# Patient Record
Sex: FEMALE | Race: WHITE
[De-identification: names, ages, dates, MRNs, and addresses within clinical notes are randomized per-mention and may not be internally consistent; named-entity substitution may affect disease eponyms.]

---

## 2018-02-25 ENCOUNTER — HOSPITAL ENCOUNTER (EMERGENCY)
Dept: HOSPITAL 24 - ER | Age: 33
Discharge: HOME | End: 2018-02-25
Payer: COMMERCIAL

## 2018-02-25 VITALS — BODY MASS INDEX: 28.5 KG/M2

## 2018-02-25 VITALS — DIASTOLIC BLOOD PRESSURE: 73 MMHG | SYSTOLIC BLOOD PRESSURE: 118 MMHG

## 2018-02-25 DIAGNOSIS — O20.9: Primary | ICD-10-CM

## 2018-02-25 LAB
BACTERIA URNS QL MICRO: (no result) /HPF
BASOPHILS # BLD AUTO: 0.3 X10^3/UL (ref 0–0.1)
BASOPHILS NFR BLD AUTO: 4.4 % (ref 0.2–1)
BILIRUB UR QL STRIP.AUTO: NEGATIVE
BUN SERPL-MCNC: 10 MG/DL (ref 7–18)
CALCIUM ALBUM COR SERPL-SCNC: (no result) MMOL/L (ref 136–145)
CALCIUM SERPL-MCNC: 9 MG/DL (ref 8.5–10.1)
CHLORIDE SERPL-SCNC: 104 MMOL/L (ref 98–107)
CLARITY UR: (no result)
CO2 SERPL-SCNC: 26.4 MMOL/L (ref 21–32)
COLOR UR AUTO: YELLOW
CREAT SERPL-MCNC: 0.81 MG/DL (ref 0.55–1.02)
EGFR  BLACK RACES: > 60 (ref 60–?)
EOSINOPHIL # BLD AUTO: 0.2 X10^3/UL (ref 0–0.2)
EOSINOPHIL NFR BLD AUTO: 3 % (ref 0.9–2.9)
ERYTHROCYTE [DISTWIDTH] IN BLOOD BY AUTOMATED COUNT: 11.7 % (ref 11.6–16.5)
GLUCOSE UR QL STRIP.AUTO: NEGATIVE
HCT VFR BLD AUTO: 39.1 % (ref 36–47)
HGB BLD-MCNC: 13.9 G/DL (ref 12–16)
KETONES UR QL STRIP.AUTO: NEGATIVE
LEUKOCYTE ESTERASE UR QL STRIP.AUTO: (no result)
LYMPHOCYTES # BLD AUTO: 1 X10^3/UL (ref 1.3–2.9)
LYMPHOCYTES NFR BLD AUTO: 17.8 % (ref 21–51)
MCH RBC QN AUTO: 33.1 PG (ref 27–34)
MCHC RBC AUTO-ENTMCNC: 35.6 G/DL (ref 33–35)
MCV RBC AUTO: 92.9 FL (ref 80–100)
MONOCYTES # BLD AUTO: 0.4 X10^3/UL (ref 0.3–0.8)
MONOCYTES NFR BLD AUTO: 7.7 % (ref 0–13)
NEUTROPHILS # BLD AUTO: 3.9 X10^3/UL (ref 2.2–4.8)
NEUTROPHILS NFR BLD AUTO: 67.1 % (ref 42–75)
NITRITE UR QL STRIP.AUTO: NEGATIVE
PH UR STRIP.AUTO: 7 [PH] (ref 5–8)
PLATELET # BLD: 221 X10^3/UL (ref 150–450)
PMV BLD AUTO: 9.8 FL (ref 7.4–11)
PROT UR QL STRIP.AUTO: (no result)
RBC # BLD AUTO: 4.21 X10^6/UL (ref 3.5–5.4)
RBC # UR STRIP.AUTO: (no result) /UL
RBC #/AREA URNS HPF: (no result) /HPF
SODIUM SERPL-SCNC: 139 MMOL/L (ref 136–145)
SP GR UR STRIP.AUTO: 1.01 (ref 1–1.03)
SQUAMOUS URNS QL MICRO: (no result) /HPF
UROBILINOGEN UR QL STRIP.AUTO: NORMAL
WBC NRBC COR # BLD AUTO: 5.8 X10^3/UL (ref 3.6–10)

## 2018-02-25 PROCEDURE — 76801 OB US < 14 WKS SINGLE FETUS: CPT

## 2018-02-25 PROCEDURE — 36415 COLL VENOUS BLD VENIPUNCTURE: CPT

## 2018-02-25 PROCEDURE — 85025 COMPLETE CBC W/AUTO DIFF WBC: CPT

## 2018-02-25 PROCEDURE — 80048 BASIC METABOLIC PNL TOTAL CA: CPT

## 2018-02-25 PROCEDURE — 81001 URINALYSIS AUTO W/SCOPE: CPT

## 2018-02-25 PROCEDURE — 99283 EMERGENCY DEPT VISIT LOW MDM: CPT

## 2018-02-25 PROCEDURE — 84702 CHORIONIC GONADOTROPIN TEST: CPT

## 2018-02-25 PROCEDURE — 99284 EMERGENCY DEPT VISIT MOD MDM: CPT

## 2018-02-25 NOTE — US
History: Pregnant with vaginal bleeding and left-sided pelvic pain. Quantitative HCG of 32. Prior HCG
 of 110.



Technique: Using a transabdominal approach multiple sagittal images of the uterus and adnexa were obt
ained.



Comparison:NONE



Findings:

Uterus is anteverted measuring 6.0 x 4.2 x 7.1 cm. It appears homogeneous. The endometrium measures 7
 mm in thickness. No endometrial fluid is demonstrated. No intrauterine gestation was seen.



The right ovary measures 2.7 x 2.0 x 2.1 cm. The left ovary measures 3.0 x 1.6 x 2.9 cm. Both ovaries
 appear normal. No significant free fluid demonstrated. No gestational sac demonstrated.



Impression: 

1. Pregnancy of unknown location. No gestational sac demonstrated. Adnexae appear normal. Differentia
l considerations include a normal early pregnancy, ectopic pregnancy, spontaneous . Given dec
reasing HCG, findings are concerning for abnormal pregnancy. Obstetrical follow-up and serial ultraso
und and serial HCG recommended.







Reported By:Electronically Signed by MYA LANE MD at 2018 11:40:58 AM

## 2025-03-20 NOTE — DR.GENAD
HPI





- PCP


Primary Care Physician: junito





- Complaint/Symptoms


Chief Complaint Doctors Comments: This 31 y/o   female presented to 

the ED with complain of left side cramping and vaginal bleeding onset two days 

ago. The blood was clotted,increased since previous two days. She denies 

vomiting or fever. She admits to smoking  3-5 cigaretts daily. She saw her OB (

Dr Valadez [phone]) on 18 OB  pregnancy visit.. [id number].


Chief Complaint:: PT C/O VAGINAL BLEEDING WITH CLOTTING. PT STATES SHE IS 

PREGNANT, BUT UNSURE OF HOW FAR ALONG SHE IS PT STATES SHE HAS BEEN TO THE ER 

IN [place] AND HAD HCG LEVELS CHECKED, AND WAS TOLD IF THE BLEEDING GOT WORSE 

TO GO AND HAVE THEM RECHECKED TO SEE IF THEY WERE GOING DOWN OR UP





- Source


History Provided: Patient





- Mode of Arrival


Mode of Arrival: Ambulatory





- Timing


Onset of Chief Complaint: 18





PMH





- PMH


Past Medical History: No


Past Surgical History: Yes


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





- Family History


History of Family Medical Conditions: Yes


Family Medical History: MI, Hypertension





- Social History


Does patient currently use any type of tobacco product: Yes


Have you used tobacco products in the last 12 months: Yes


Type of Tobacco Use: Cigarettes


Alcohol Use: None


Do you use any recreational Drugs:: No


Lives With: Family


Lives Where: Home





- infectious screening


In the last 2 months have you had wt loss of >10#?: NO


Have you had fever, night sweats or hemotysis?: No


Have you traveled outside the country in the last 6 months?: No


Isolation: Standard





ROS





- Review of Systems


Constitutional: See HPI


Eyes: No Symptoms Reported


ENTM: No Symptoms Reported


Respiratoy: No Symptoms Reported


Cardiovascular: No Symptoms Reported


Gastrointestinal/Abdominal: No Symptoms Reported


Genitourinary: No Symptoms Reported


Neurological: No Symptoms Reported


Musculoskeletal: No Symptoms Reported


Integumentary: No Symptoms Reported


Hematologic/Lymphatic: No Symptoms Reported


Endocrine: No Symptoms Reported


All Other Systems: Reviewed and Negative





PE





- Vital Signs


Vitals: 


 





Temperature                      98.1 F


Pulse Rate                       93


Respiratory Rate                 20


Blood Pressure                   118/73


O2 Sat by Pulse Oximetry         97











- General


Limitations: No Limitations


General Appearance: Alert, In No Apparent Distress





- Head


Head Exam: Normal Inspection, Atraumatic





- Eyes


Eye exam: Normal Appearance, PERRL, EOMI





- ENT


ENT Exam: Normal Exam, Normal Oropharynx


External Ear Exam: Normal External Inspection


TM/Canal Exam: Bilateral Normal


Nose Exam: Normal Nose Exam


Mouth Exam: Normal Inspection


Throat Exam: Normal Inspection





- Neck


Neck Exam: Normal Inspection, Full ROM





- Chest


Chest Inspection: Normal Inspection





- Respiratory


Respiratory Exam: Normal Lung Sounds Bilat


Respiratory Exam: Bilateral Clear to Auscultation





- Cardiovascular


Cardiovascular Exam: Regular Rate, Normal Rhythm





- Abdominal Exam


Abdominal Exam: Normal Inspection, Normal Bowel Sounds


Abdominal Tenderness: LLQ





- Extremities


Extremities Exam: Normal Inspection





- Back


Back Exam: Normal Inspection





- Neurologic


Neurological Exam: Alert, Oriented X3, CN II-XII Intact





- Psychiatric


Psychiatric Exam: Normal Affect, Normal Mood





Course





- Reevaluation


1st: Unchanged





ROR





- Labs Reviewed


Result Diagrams: 


 18 10:05





 18 10:05


Laboratory: 


 











WBC  5.8 X10^3/uL (3.6-10.0)   18  10:05    


 


RBC  4.21 X10^6/uL (3.5-5.4)   18  10:05    


 


Hgb  13.9 g/dL (12.0-16.0)   18  10:05    


 


Hct  39.1 % (36.0-47.0)   18  10:05    


 


MCV  92.9 fL (80.0-100.0)   18  10:05    


 


MCH  33.1 pg (27.0-34.0)   18  10:05    


 


MCHC  35.6 g/dL (33.0-35.0)  H  18  10:05    


 


RDW  11.7 % (11.6-16.5)   18  10:05    


 


Plt Count  221 X10^3/uL (150.0-450.0)   18  10:05    


 


MPV  9.8 fL (7.4-11.0)   18  10:05    


 


Neut %  67.1 % (42.0-75.0)   18  10:05    


 


Lymph %  17.8 % (21.0-51.0)  L  18  10:05    


 


Mono %  7.7 % (0.0-13.0)   18  10:05    


 


Eos %  3.0 % (0.9-2.9)  H  18  10:05    


 


Baso %  4.4 % (0.2-1.0)  H  18  10:05    


 


Neut #  3.9 x10^3/uL (2.2-4.8)   18  10:05    


 


Lymph #  1.0 X10^3/uL (1.3-2.9)  L  18  10:05    


 


Mono #  0.4 x10^3/uL (0.3-0.8)   18  10:05    


 


Eos #  0.2 x10^3/uL (0.0-0.2)   18  10:05    


 


Baso #  0.3 X10^3/uL (0.0-0.1)  H  18  10:05    


 


Absolute Nucleated RBC  0.0 /100WBC  18  10:05    


 


Sodium  139 mmol/L (136-145)   18  10:05    


 


Corrected Sodium  TNP   18  10:05    


 


Potassium  4.2 mmol/L (3.5-5.1)   18  10:05    


 


Chloride  104 mmol/L ()   18  10:05    


 


Carbon Dioxide  26.4 mmol/L (21-32)   18  10:05    


 


BUN  10 mg/dL (7-18)   18  10:05    


 


Creatinine  0.81 mg/dL (0.55-1.02)   18  10:05    


 


Est GFR (MDRD) Af Amer  > 60  (>60)   18  10:05    


 


Est GFR (MDRD) Non-Af  > 60  (>60)   18  10:05    


 


Glucose  102 mg/dL (65-99)  H  18  10:05    


 


Calcium  9.0 mg/dL (8.5-10.1)   18  10:05    


 


HCG, Quant  32 mIU/mL (0-6)  H  18  10:05    


 


Specimen Type  Clean catch urine   18  10:01    


 


Urine Color  Yellow  (YELLOW)   18  10:01    


 


Urine Appearance  Hazy  (CLEAR)   18  10:01    


 


Urine pH  7.0  (5.0 - 8.0)   18  10:01    


 


Ur Specific Gravity  1.010  (1.000-1.030)   18  10:01    


 


Urine Protein  1+  (NEGATIVE)   18  10:01    


 


Urine Glucose (UA)  Negative  (NEGATIVE)   18  10:01    


 


Urine Ketones  Negative  (NEGATIVE)   18  10:01    


 


Urine Occult Blood  5+  (NEGATIVE)   18  10:01    


 


Urine Nitrite  Negative  (NEGATIVE)   18  10:01    


 


Urine Bilirubin  Negative  (NEGATIVE)   18  10:01    


 


Urine Urobilinogen  Normal  (NORMAL)   18  10:01    


 


Ur Leukocyte Esterase  1+  (NEGATIVE)   18  10:01    


 


Urine RBC  20-25 /HPF (NONE SEEN)   18  10:01    


 


Urine WBC  0-2 /HPF (NONE SEEN)   18  10:01    


 


Ur Squamous Epith Cells  Few /HPF (NEGATIVE)   18  10:01    


 


Urine Bacteria  Trace /HPF (NEGATIVE)   18  10:01    


 


Ur Culture Indicated?  No/not indicated   18  10:01    














- XRAY


XRAY Interpreted by: Radiologist (OB US:Impression: Pregnancy of unknown 

location. No gestational sac demonstrated.  Adnexae appear normal. Differential 

considerations include a normal early pregnancy, ectopic pregnancy, 

sponstaneous . Given decreasing HCG (110 prior; 39 today); findings are 

concerning for abnormal pregnancy.  Obstetricqal follow-up and serial 

ultrasound and serial HGC recommended.)





- Diagnosis


Discharge Problem: 


 Vaginal bleeding in pregnant patient at less than 20 weeks gestation








- Discharge Plan


Condition: Stable





- Follow ups/Referrals


Follow ups/Referrals: 


MUNDO ARREOLA [Primary Care Provider] - 3 days





- Instructions Immediate treatment of shoulder pain includes RICE - Rest, ice, compression and elevation to the affected joint or limb as needed.    Rest. Allow your injury to heal before you do slow movements.  Place an ice pack or a bag of frozen peas wrapped in a towel over the painful part. Never put ice right on the skin. Do not leave the ice on more than 10 to 15 minutes at a time.  Prop your arm on pillows to help with swelling.    Please drink plenty of fluids (liquid IV, powerade, gatorade)  Please get plenty of rest.  If not allergic, take Tylenol (Acetaminophen) 650 mg to  1 g every 6 hours as needed for fever/pain.  NSAIDS can damage kidneys if there are concerns with rhabdomyolysis./      If you were prescribed a narcotic or muscle relaxer (Flexeril or Robaxin), do not drive or operate heavy equipment or machinery while taking these medications. These medications can make you drowsy. If you are driving, it is recommended to take ibuprofen TID prn pain and take flexeril/robaxin at night.    If you were not prescribed an anti-inflammatory medication, and if you do not have any history of stomach/intestinal ulcers, or kidney disease, or are not taking a blood thinner such as Coumadin, Plavix, Pradaxa, Eloquis, or Xaralta for example, it is OK to take over the counter Ibuprofen or Advil or Motrin or Aleve as directed.  Do not take these medications on an empty stomach.    Please drink plenty of fluids.  Please get plenty of rest.      Please remember that you have received care at an urgent care today. Urgent cares are not emergency rooms and are not equipped to handle life threatening emergencies and cannot rule in or out certain medical conditions and you may be released before all of your medical problems are known or treated. Please arrange follow up with your primary care physician or speciality clinic (orthopedics) within 2-5 days if your signs and symptoms have not resolved or worsen.     Patient can call our  Referral Hotline at [phone] to make an appointment.    Please return here or go to the Emergency Department for any concerns or worsening of condition.Patient was educated on signs/symptoms that would warrant emergent medical attention.    Have signs of infection, such as a fever of 100.4°F (38°C) or higher, chills, or pain when urinating   Have very bad belly pain   Have trouble breathing   Are urinating less than normal or cannot urinate   Have new or worsening symptoms, like:   Feeling very tired and have no energy   Not being hungry or losing weight without trying   Nausea or vomiting   Being unable to sleep